# Patient Record
Sex: FEMALE | Race: WHITE | URBAN - METROPOLITAN AREA
[De-identification: names, ages, dates, MRNs, and addresses within clinical notes are randomized per-mention and may not be internally consistent; named-entity substitution may affect disease eponyms.]

---

## 2022-12-21 ENCOUNTER — ESTABLISHED PATIENT (OUTPATIENT)
Dept: URBAN - METROPOLITAN AREA CLINIC 19 | Facility: CLINIC | Age: 52
End: 2022-12-21

## 2022-12-21 PROCEDURE — 92310A CONTACT LENS 50

## 2022-12-21 PROCEDURE — 92014 COMPRE OPH EXAM EST PT 1/>: CPT

## 2022-12-21 ASSESSMENT — KERATOMETRY
OS_K2POWER_DIOPTERS: 45.50
OS_AXISANGLE_DEGREES: 124
OD_K2POWER_DIOPTERS: 44.50
OD_K1POWER_DIOPTERS: 44.00
OS_AXISANGLE2_DEGREES: 34
OS_K1POWER_DIOPTERS: 45.00
OD_AXISANGLE_DEGREES: 42
OD_AXISANGLE2_DEGREES: 132

## 2022-12-21 ASSESSMENT — TONOMETRY
OS_IOP_MMHG: 14
OD_IOP_MMHG: 13

## 2022-12-21 ASSESSMENT — VISUAL ACUITY
OD_CC: 20/20
OS_CC: 20/20
OU_CC: 20/20

## 2023-12-21 ENCOUNTER — ESTABLISHED PATIENT (OUTPATIENT)
Dept: URBAN - METROPOLITAN AREA CLINIC 19 | Facility: CLINIC | Age: 53
End: 2023-12-21

## 2023-12-21 DIAGNOSIS — D31.32: ICD-10-CM

## 2023-12-21 DIAGNOSIS — D31.31: ICD-10-CM

## 2023-12-21 DIAGNOSIS — H04.123: ICD-10-CM

## 2023-12-21 DIAGNOSIS — H52.13: ICD-10-CM

## 2023-12-21 PROCEDURE — 92014 COMPRE OPH EXAM EST PT 1/>: CPT

## 2023-12-21 PROCEDURE — 92015 DETERMINE REFRACTIVE STATE: CPT

## 2023-12-21 PROCEDURE — 92310J CONTACT LENS FITTING 49

## 2023-12-21 ASSESSMENT — KERATOMETRY
OD_AXISANGLE2_DEGREES: 135
OS_K1POWER_DIOPTERS: 45.75
OS_AXISANGLE2_DEGREES: 120
OD_K2POWER_DIOPTERS: 45
OD_K1POWER_DIOPTERS: 44.25
OS_AXISANGLE_DEGREES: 30
OD_AXISANGLE_DEGREES: 45
OS_K2POWER_DIOPTERS: 45.25

## 2023-12-21 ASSESSMENT — VISUAL ACUITY
OU_CC: 20/25
OD_CC: 20/20-2
OS_CC: 20/20

## 2023-12-21 ASSESSMENT — TONOMETRY
OS_IOP_MMHG: 15
OD_IOP_MMHG: 15

## 2024-01-11 ENCOUNTER — FOLLOW UP (OUTPATIENT)
Dept: URBAN - METROPOLITAN AREA CLINIC 19 | Facility: CLINIC | Age: 54
End: 2024-01-11

## 2024-01-11 DIAGNOSIS — H52.13: ICD-10-CM

## 2024-01-11 PROCEDURE — 99211NC NO CHARGE VISIT

## 2024-01-11 ASSESSMENT — VISUAL ACUITY
OU_CC: 20/20
OU_CC: 20/25-3

## 2024-12-30 ENCOUNTER — COMPREHENSIVE EXAM (OUTPATIENT)
Age: 54
End: 2024-12-30

## 2024-12-30 DIAGNOSIS — H52.203: ICD-10-CM

## 2024-12-30 DIAGNOSIS — H52.4: ICD-10-CM

## 2024-12-30 DIAGNOSIS — H52.13: ICD-10-CM

## 2024-12-30 PROCEDURE — 92310-3 LEVEL 3 SOFT LENS UPDATE

## 2024-12-30 PROCEDURE — 92015 DETERMINE REFRACTIVE STATE: CPT

## 2024-12-30 PROCEDURE — 92014 COMPRE OPH EXAM EST PT 1/>: CPT
